# Patient Record
Sex: FEMALE | Race: WHITE
[De-identification: names, ages, dates, MRNs, and addresses within clinical notes are randomized per-mention and may not be internally consistent; named-entity substitution may affect disease eponyms.]

---

## 2018-09-12 ENCOUNTER — HOSPITAL ENCOUNTER (OUTPATIENT)
Dept: HOSPITAL 95 - LAB | Age: 58
End: 2018-09-12
Attending: OBSTETRICS & GYNECOLOGY
Payer: COMMERCIAL

## 2018-09-12 DIAGNOSIS — Z12.4: Primary | ICD-10-CM

## 2018-09-12 PROCEDURE — G0123 SCREEN CERV/VAG THIN LAYER: HCPCS

## 2018-09-14 LAB — OTHER STN SPEC: (no result)

## 2019-02-08 ENCOUNTER — HOSPITAL ENCOUNTER (OUTPATIENT)
Dept: HOSPITAL 95 - ORSCSDS | Age: 59
Discharge: HOME | End: 2019-02-08
Attending: INTERNAL MEDICINE
Payer: COMMERCIAL

## 2019-02-08 VITALS — BODY MASS INDEX: 45.14 KG/M2 | HEIGHT: 67.01 IN | WEIGHT: 287.59 LBS

## 2019-02-08 DIAGNOSIS — Z86.010: ICD-10-CM

## 2019-02-08 DIAGNOSIS — D12.3: ICD-10-CM

## 2019-02-08 DIAGNOSIS — D12.4: ICD-10-CM

## 2019-02-08 DIAGNOSIS — J45.909: ICD-10-CM

## 2019-02-08 DIAGNOSIS — K57.30: ICD-10-CM

## 2019-02-08 DIAGNOSIS — I10: ICD-10-CM

## 2019-02-08 DIAGNOSIS — K64.8: ICD-10-CM

## 2019-02-08 DIAGNOSIS — K44.9: ICD-10-CM

## 2019-02-08 DIAGNOSIS — K74.60: ICD-10-CM

## 2019-02-08 DIAGNOSIS — Z79.899: ICD-10-CM

## 2019-02-08 DIAGNOSIS — Z87.891: ICD-10-CM

## 2019-02-08 DIAGNOSIS — G47.33: ICD-10-CM

## 2019-02-08 DIAGNOSIS — K21.9: ICD-10-CM

## 2019-02-08 DIAGNOSIS — K75.81: ICD-10-CM

## 2019-02-08 DIAGNOSIS — D12.5: ICD-10-CM

## 2019-02-08 DIAGNOSIS — E66.01: ICD-10-CM

## 2019-02-08 DIAGNOSIS — R10.11: Primary | ICD-10-CM

## 2019-02-08 DIAGNOSIS — K22.70: ICD-10-CM

## 2019-02-08 PROCEDURE — 0DB58ZX EXCISION OF ESOPHAGUS, VIA NATURAL OR ARTIFICIAL OPENING ENDOSCOPIC, DIAGNOSTIC: ICD-10-PCS | Performed by: INTERNAL MEDICINE

## 2019-02-08 PROCEDURE — 0DBL8ZX EXCISION OF TRANSVERSE COLON, VIA NATURAL OR ARTIFICIAL OPENING ENDOSCOPIC, DIAGNOSTIC: ICD-10-PCS | Performed by: INTERNAL MEDICINE

## 2019-02-08 PROCEDURE — 0DB68ZX EXCISION OF STOMACH, VIA NATURAL OR ARTIFICIAL OPENING ENDOSCOPIC, DIAGNOSTIC: ICD-10-PCS | Performed by: INTERNAL MEDICINE

## 2019-02-08 PROCEDURE — 0DBN8ZX EXCISION OF SIGMOID COLON, VIA NATURAL OR ARTIFICIAL OPENING ENDOSCOPIC, DIAGNOSTIC: ICD-10-PCS | Performed by: INTERNAL MEDICINE

## 2019-02-08 PROCEDURE — 0DBM8ZX EXCISION OF DESCENDING COLON, VIA NATURAL OR ARTIFICIAL OPENING ENDOSCOPIC, DIAGNOSTIC: ICD-10-PCS | Performed by: INTERNAL MEDICINE

## 2019-02-08 NOTE — NUR
02/08/19 1122 Tobi Geiger
1ST IV ATTEMPT IN RH UNSUCCESSFUL, HIT VALVE, ORSC.DFT
2ND IV ATTEMPT IN RAC SUCCESSFUL. ORSC.DFT

## 2021-02-25 NOTE — NUR
02/25/21 0754 Shirley Berry
BUPIVACAINE 0.5% 30ML MIXED WITH EPI 0.15CC FOR A CONSTITUTION OF
BUPIVACAINE 0.5% W/ EPI 1:200,000. INJECTED BY DR. CERDA

## 2022-04-26 NOTE — NUR
04/26/22 1254 Argentina Mcadams
HIGH FIBER DIET/DIVERTICULOSIS PAMPHLETS GIVEN TO PT PRIOR TO D/C TO
HOME.